# Patient Record
(demographics unavailable — no encounter records)

---

## 2024-11-20 NOTE — DISCUSSION/SUMMARY
[de-identified] : 42m with long history of right shoulder pain and micoinstability , has failed conservative treatment.  1) MRA right shoulder, eval labral tear 2) cryotherapy, rest and activity modification  3) rtc after MRA  Entered by Nicole Holland acting as scribe. Dr. Zamora- The documentation recorded by the scribe accurately reflects the service I personally performed and the decisions made by me.

## 2024-11-20 NOTE — HISTORY OF PRESENT ILLNESS
[de-identified] : 11/20/2024 Mr. JADA SARMIENTO, a 42 year old male (LHD, quinn, snowboarding, wakeboarding, etc), presents today for right shoulder pain since 2014, potentially from falling on the shoulder wakeboarding. Pain worsening since 2022. Pain is posterior and deep and sometimes over the AC joint and associated with weakness, paresthesia to fingers, clicking, limited ROM. Worse with OH movements and adduction. Better at rest.  H/O cervical and lumbar radiculopathy - sees Dr. Kelly for epidurals

## 2024-11-20 NOTE — HISTORY OF PRESENT ILLNESS
[de-identified] : 11/20/2024 Mr. JADA SARMIENTO, a 42 year old male (LHD, quinn, snowboarding, wakeboarding, etc), presents today for right shoulder pain since 2014, potentially from falling on the shoulder wakeboarding. Pain worsening since 2022. Pain is posterior and deep and sometimes over the AC joint and associated with weakness, paresthesia to fingers, clicking, limited ROM. Worse with OH movements and adduction. Better at rest.  H/O cervical and lumbar radiculopathy - sees Dr. Kelly for epidurals

## 2024-11-20 NOTE — PHYSICAL EXAM
[Right] : right shoulder [NL (0-180)] : full active forward flexion 0-180 degrees [5 ___] : forward flexion 5[unfilled]/5 [5___] : internal rotation 5[unfilled]/5 [] : motor and sensory intact distally [FreeTextEntry9] : IR range to T12  [de-identified] : active abduction 160 degrees [de-identified] : external rotation at 90 degrees of abduction 90 degrees [TWNoteComboBox5] : internal rotation at 90 degrees of abduction 90 degrees

## 2024-11-20 NOTE — PHYSICAL EXAM
[Right] : right shoulder [NL (0-180)] : full active forward flexion 0-180 degrees [5 ___] : forward flexion 5[unfilled]/5 [5___] : internal rotation 5[unfilled]/5 [] : motor and sensory intact distally [FreeTextEntry9] : IR range to T12  [de-identified] : active abduction 160 degrees [de-identified] : external rotation at 90 degrees of abduction 90 degrees [TWNoteComboBox5] : internal rotation at 90 degrees of abduction 90 degrees

## 2024-11-20 NOTE — DISCUSSION/SUMMARY
[de-identified] : 42m with long history of right shoulder pain and micoinstability , has failed conservative treatment.  1) MRA right shoulder, eval labral tear 2) cryotherapy, rest and activity modification  3) rtc after MRA  Entered by Nicole Holland acting as scribe. Dr. Zamora- The documentation recorded by the scribe accurately reflects the service I personally performed and the decisions made by me.

## 2024-12-12 NOTE — DATA REVIEWED
[Report was reviewed and noted in the chart] : The report was reviewed and noted in the chart [I independently reviewed and interpreted images and report] : I independently reviewed and interpreted images and report [I reviewed the films/CD] : I reviewed the films/CD [FreeTextEntry1] : MRA Right Shoulder 12.10.24: Moderate acromioclavicular joint osteoarthritis. Mild supraspinatus and infraspinatus tendinosis with fraying. Question partial superior labrum tear.

## 2024-12-12 NOTE — PHYSICAL EXAM
[Right] : right shoulder [NL (0-180)] : full active forward flexion 0-180 degrees [5 ___] : forward flexion 5[unfilled]/5 [5___] : internal rotation 5[unfilled]/5 [] : negative Speed's [FreeTextEntry9] : IR range to T12  [de-identified] : active abduction 160 degrees [de-identified] : external rotation at 90 degrees of abduction 90 degrees [TWNoteComboBox5] : internal rotation at 90 degrees of abduction 90 degrees

## 2024-12-12 NOTE — HISTORY OF PRESENT ILLNESS
[de-identified] : 12/12/24: Here for f/u R shoulder. MRA review today.  11/20/2024 Mr. JADA SARMIENTO, a 42 year old male (LHD, [employer]inn, snowboarding, wakeboarding, etc), presents today for right shoulder pain since 2014, potentially from falling on the shoulder wakeboarding. Pain worsening since 2022. Pain is posterior and deep and sometimes over the AC joint and associated with weakness, paresthesia to fingers, clicking, limited ROM. Worse with OH movements and adduction. Better at rest.  H/O cervical and lumbar radiculopathy - sees Dr. Kelly for epidurals  [] : no

## 2024-12-12 NOTE — DISCUSSION/SUMMARY
[de-identified] : 42m with MRA of the right shoulder showing moderate ac joint djd and mild rotator cuff and biceps tendinitis.  1) home exercises provided and discussed exercise modifications  2) meloxicam rx - gi precautions reviewed  3) cryotherapy, rest and activity modification  4) if pain persists or worsens will consider csi 5) recommended glucosamine and chondroitin  6) rtc 6 weeks   Entered by Nicole Holland acting as scribe. Dr. Zamora- The documentation recorded by the scribe accurately reflects the service I personally performed and the decisions made by me.